# Patient Record
Sex: FEMALE
[De-identification: names, ages, dates, MRNs, and addresses within clinical notes are randomized per-mention and may not be internally consistent; named-entity substitution may affect disease eponyms.]

---

## 2021-12-31 ENCOUNTER — TRANSCRIPTION ENCOUNTER (OUTPATIENT)
Age: 51
End: 2021-12-31

## 2024-10-02 ENCOUNTER — APPOINTMENT (OUTPATIENT)
Dept: OBGYN | Facility: CLINIC | Age: 54
End: 2024-10-02
Payer: COMMERCIAL

## 2024-10-02 VITALS
BODY MASS INDEX: 35.73 KG/M2 | HEIGHT: 60 IN | WEIGHT: 182 LBS | SYSTOLIC BLOOD PRESSURE: 122 MMHG | DIASTOLIC BLOOD PRESSURE: 60 MMHG

## 2024-10-02 DIAGNOSIS — Z80.41 FAMILY HISTORY OF MALIGNANT NEOPLASM OF OVARY: ICD-10-CM

## 2024-10-02 DIAGNOSIS — Z80.3 FAMILY HISTORY OF MALIGNANT NEOPLASM OF BREAST: ICD-10-CM

## 2024-10-02 DIAGNOSIS — Z83.3 FAMILY HISTORY OF DIABETES MELLITUS: ICD-10-CM

## 2024-10-02 DIAGNOSIS — Z72.3 LACK OF PHYSICAL EXERCISE: ICD-10-CM

## 2024-10-02 DIAGNOSIS — Z78.9 OTHER SPECIFIED HEALTH STATUS: ICD-10-CM

## 2024-10-02 DIAGNOSIS — R92.30 DENSE BREASTS, UNSPECIFIED: ICD-10-CM

## 2024-10-02 DIAGNOSIS — Z12.31 ENCOUNTER FOR SCREENING MAMMOGRAM FOR MALIGNANT NEOPLASM OF BREAST: ICD-10-CM

## 2024-10-02 DIAGNOSIS — Z76.89 PERSONS ENCOUNTERING HEALTH SERVICES IN OTHER SPECIFIED CIRCUMSTANCES: ICD-10-CM

## 2024-10-02 PROCEDURE — 99459 PELVIC EXAMINATION: CPT

## 2024-10-02 PROCEDURE — 99203 OFFICE O/P NEW LOW 30 MIN: CPT

## 2024-10-02 NOTE — PHYSICAL EXAM
[Chaperone Present] : A chaperone was present in the examining room during all aspects of the physical examination [[id number]] : A chaperone was present during the pelvic exam. [Appropriately responsive] : appropriately responsive [Alert] : alert [No Acute Distress] : no acute distress [Oriented x3] : oriented x3

## 2024-10-02 NOTE — PHYSICAL EXAM
[Chaperone Present] : A chaperone was present in the examining room during all aspects of the physical examination [[id number]] : A chaperone was present during the pelvic exam. [Appropriately responsive] : appropriately responsive [Alert] : alert [No Acute Distress] : no acute distress [Oriented x3] : oriented x3

## 2024-10-09 NOTE — HISTORY OF PRESENT ILLNESS
[N] : Patient does not use contraception [Y] : Positive pregnancy history [Menopause Age: ____] : age at menopause was [unfilled] [Currently Active] : currently active [Men] : men [Mammogramdate] : 09/30/23 [TextBox_19] : BR1 (SCHEDULED FOR 10/2024) [BreastSonogramDate] : 09/30/23 [TextBox_25] : BR0 [PapSmeardate] : 01/2024 [TextBox_31] : NORMAL PER PT  [BoneDensityDate] : NEVER [ColonoscopyDate] : 2020 [TextBox_43] : POLYPS, DUE 2025 [LMPDate] : 2012 [PGHxTotal] : 2 [PGxPremature] : 2 [[facility] [FreeTextEntry1] : 2012

## 2024-10-09 NOTE — HISTORY OF PRESENT ILLNESS
[N] : Patient does not use contraception [Y] : Positive pregnancy history [Menopause Age: ____] : age at menopause was [unfilled] [Currently Active] : currently active [Men] : men [Mammogramdate] : 09/30/23 [TextBox_19] : BR1 (SCHEDULED FOR 10/2024) [BreastSonogramDate] : 09/30/23 [TextBox_25] : BR0 [PapSmeardate] : 01/2024 [TextBox_31] : NORMAL PER PT  [BoneDensityDate] : NEVER [ColonoscopyDate] : 2020 [TextBox_43] : POLYPS, DUE 2025 [LMPDate] : 2012 [PGHxTotal] : 2 [PGxPremature] : 2 [[facility] 2 [FreeTextEntry1] : 2012

## 2024-10-09 NOTE — END OF VISIT
[FreeTextEntry3] : I, Rob Mccollum solely acted as scribe for Dr. Kimi Mesa on 10/02/2024  All medical entries made by the Scribe were at my, Dr. Chaparro, direction and personally dictated by me on 10/02/2024 . I have reviewed the chart and agree that the record accurately reflects my personal performance of the history, physical exam, assessment and plan. I have also personally directed, reviewed, and agreed with the chart.

## 2024-10-09 NOTE — PLAN
[FreeTextEntry1] : Prescription for mammogram screening given.  F/u for annual exam in 01/2025 or as needed.